# Patient Record
Sex: MALE | Race: WHITE | URBAN - METROPOLITAN AREA
[De-identification: names, ages, dates, MRNs, and addresses within clinical notes are randomized per-mention and may not be internally consistent; named-entity substitution may affect disease eponyms.]

---

## 2024-01-30 ENCOUNTER — OFFICE VISIT (OUTPATIENT)
Age: 19
End: 2024-01-30

## 2024-01-30 VITALS
TEMPERATURE: 98.3 F | RESPIRATION RATE: 18 BRPM | DIASTOLIC BLOOD PRESSURE: 84 MMHG | HEIGHT: 68 IN | SYSTOLIC BLOOD PRESSURE: 134 MMHG | WEIGHT: 228.2 LBS | BODY MASS INDEX: 34.59 KG/M2 | HEART RATE: 69 BPM | OXYGEN SATURATION: 96 %

## 2024-01-30 DIAGNOSIS — U07.1 COVID-19: ICD-10-CM

## 2024-01-30 DIAGNOSIS — Z20.822 CLOSE EXPOSURE TO COVID-19 VIRUS: Primary | ICD-10-CM

## 2024-01-30 LAB
Lab: ABNORMAL
QC PASS/FAIL: ABNORMAL
SARS-COV-2 RDRP RESP QL NAA+PROBE: POSITIVE

## 2024-01-30 RX ORDER — DEXTROAMPHETAMINE SACCHARATE, AMPHETAMINE ASPARTATE, DEXTROAMPHETAMINE SULFATE AND AMPHETAMINE SULFATE 2.5; 2.5; 2.5; 2.5 MG/1; MG/1; MG/1; MG/1
10 TABLET ORAL 2 TIMES DAILY
COMMUNITY

## 2024-01-30 NOTE — PROGRESS NOTES
Ned Carmichael (:  2005) is a 18 y.o. male,New patient, here for evaluation of the following chief complaint(s):  Covid Testing (COVID Testing, pt has been exposed.)      ASSESSMENT/PLAN:  1. Close exposure to COVID-19 virus  - POCT COVID-19 Rapid, NAAT POSITIVE    2. COVID-19  -POSITIVE COVID TEST   -SELF QUARANTINE FOR 5 DAYS, GO TO ER IF SOB OR TROUBLE BREATHING.       Return if symptoms worsen or fail to improve.    SUBJECTIVE/OBJECTIVE:  PRESENT TO CLINIC FOR COVID TESTING AS BEEN EXPOSED. NO SYMPTOMS      History provided by:  Patient      Vitals:    24 1100   BP: 134/84   Pulse: 69   Resp: 18   Temp: 98.3 °F (36.8 °C)   SpO2: 96%   Weight: 103.5 kg (228 lb 3.2 oz)   Height: 1.727 m (5' 8\")       Review of Systems    Physical Exam  Constitutional:       Appearance: Normal appearance.   HENT:      Head: Normocephalic and atraumatic.      Nose: Nose normal.      Mouth/Throat:      Mouth: Mucous membranes are moist.   Eyes:      Pupils: Pupils are equal, round, and reactive to light.   Pulmonary:      Effort: Pulmonary effort is normal.      Breath sounds: Normal breath sounds.   Musculoskeletal:         General: Normal range of motion.      Cervical back: Normal range of motion and neck supple.   Neurological:      Mental Status: He is alert and oriented to person, place, and time.           An electronic signature was used to authenticate this note.    --Mele Cardenas DO